# Patient Record
Sex: MALE | Race: WHITE
[De-identification: names, ages, dates, MRNs, and addresses within clinical notes are randomized per-mention and may not be internally consistent; named-entity substitution may affect disease eponyms.]

---

## 2019-02-16 ENCOUNTER — HOSPITAL ENCOUNTER (EMERGENCY)
Dept: HOSPITAL 92 - SCSER | Age: 69
Discharge: HOME | End: 2019-02-16
Payer: COMMERCIAL

## 2019-02-16 DIAGNOSIS — N41.9: Primary | ICD-10-CM

## 2019-02-16 DIAGNOSIS — Z79.899: ICD-10-CM

## 2019-02-16 DIAGNOSIS — Z79.82: ICD-10-CM

## 2019-02-16 DIAGNOSIS — N40.0: ICD-10-CM

## 2019-02-16 LAB
ALBUMIN SERPL BCG-MCNC: 4.2 G/DL (ref 3.4–4.8)
ALP SERPL-CCNC: 76 U/L (ref 40–150)
ALT SERPL W P-5'-P-CCNC: 22 U/L (ref 8–55)
ANION GAP SERPL CALC-SCNC: 16 MMOL/L (ref 10–20)
AST SERPL-CCNC: 23 U/L (ref 5–34)
BACTERIA UR QL AUTO: (no result) HPF
BASOPHILS # BLD AUTO: 0.1 THOU/UL (ref 0–0.2)
BASOPHILS NFR BLD AUTO: 0.8 % (ref 0–1)
BILIRUB SERPL-MCNC: 0.7 MG/DL (ref 0.2–1.2)
BUN SERPL-MCNC: 17 MG/DL (ref 8.4–25.7)
CALCIUM SERPL-MCNC: 9.6 MG/DL (ref 7.8–10.44)
CHLORIDE SERPL-SCNC: 106 MMOL/L (ref 98–107)
CO2 SERPL-SCNC: 24 MMOL/L (ref 23–31)
CREAT CL PREDICTED SERPL C-G-VRATE: 0 ML/MIN (ref 70–130)
EOSINOPHIL # BLD AUTO: 0.1 THOU/UL (ref 0–0.7)
EOSINOPHIL NFR BLD AUTO: 1.4 % (ref 0–10)
GLOBULIN SER CALC-MCNC: 3.4 G/DL (ref 2.4–3.5)
GLUCOSE SERPL-MCNC: 103 MG/DL (ref 80–115)
HGB BLD-MCNC: 15.6 G/DL (ref 14–18)
LYMPHOCYTES # BLD: 1.4 THOU/UL (ref 1.2–3.4)
LYMPHOCYTES NFR BLD AUTO: 14.6 % (ref 21–51)
MCH RBC QN AUTO: 31.6 PG (ref 27–31)
MCV RBC AUTO: 88.7 FL (ref 78–98)
MONOCYTES # BLD AUTO: 1 THOU/UL (ref 0.11–0.59)
MONOCYTES NFR BLD AUTO: 10.5 % (ref 0–10)
NEUTROPHILS # BLD AUTO: 7 THOU/UL (ref 1.4–6.5)
NEUTROPHILS NFR BLD AUTO: 72.7 % (ref 42–75)
PLATELET # BLD AUTO: 171 THOU/UL (ref 130–400)
POTASSIUM SERPL-SCNC: 3.8 MMOL/L (ref 3.5–5.1)
PROT UR STRIP.AUTO-MCNC: 30 MG/DL
RBC # BLD AUTO: 4.93 MILL/UL (ref 4.7–6.1)
RBC UR QL AUTO: (no result) HPF (ref 0–3)
SODIUM SERPL-SCNC: 142 MMOL/L (ref 136–145)
SP GR UR STRIP: 1.02 (ref 1–1.03)
WBC # BLD AUTO: 9.6 THOU/UL (ref 4.8–10.8)
WBC UR QL AUTO: (no result) HPF (ref 0–3)

## 2019-02-16 PROCEDURE — 87491 CHLMYD TRACH DNA AMP PROBE: CPT

## 2019-02-16 PROCEDURE — 85025 COMPLETE CBC W/AUTO DIFF WBC: CPT

## 2019-02-16 PROCEDURE — 81003 URINALYSIS AUTO W/O SCOPE: CPT

## 2019-02-16 PROCEDURE — 76870 US EXAM SCROTUM: CPT

## 2019-02-16 PROCEDURE — 80053 COMPREHEN METABOLIC PANEL: CPT

## 2019-02-16 PROCEDURE — 74177 CT ABD & PELVIS W/CONTRAST: CPT

## 2019-02-16 PROCEDURE — 81015 MICROSCOPIC EXAM OF URINE: CPT

## 2019-02-16 PROCEDURE — 87086 URINE CULTURE/COLONY COUNT: CPT

## 2019-02-16 PROCEDURE — 87591 N.GONORRHOEAE DNA AMP PROB: CPT

## 2019-02-16 NOTE — CT
CT ABDOMEN AND PELVIS WITH IV CONTRAST:

2/16/19

 

HISTORY: 

Abdominal pain, difficulty urinating and painful urination. 

 

FINDINGS:  

The lung bases are unremarkable. The liver, spleen, pancreas, adrenal glands and kidneys are normal. 
No calcified gallstones are seen. No free air, free fluid, or lymphadenopathy seen in the abdomen or 
pelvis. A normal appearing appendix is seen. There is colonic diverticulosis without evidence of dive
rticulitis. No aneurysmal dilatation of the abdominal aorta is seen. the prostate is enlarged. Degene
rative changes are present in the spine. 

 

IMPRESSION:  

1.      Colonic diverticulosis.

2.      Prostatic enlargement.

 

POS: [facility]

## 2019-02-16 NOTE — ULT
TESTICULAR ULTRASOUND WITH GRAY SCALE, COLOR FLOW AND SPECTRAL DOPPLER IMAGIN19

 

HISTORY: 

Testicular pain radiating from abdomen. 

 

FINDINGS:  

The right testis measures 4.6 x 2.3 x 2.9 cm and the left testis measures 4.3 x 2.6 x 3 cm. No testic
ular mass or microlithiasis are seen. Symmetric flow is demonstrated to the testes and epididymi bila
terally. There are cysts in the epididymal head on either side. Bilateral hydroceles are seen, left g
reater than right.

 

IMPRESSION:  

1.      No evidence of testicular mass or torsion. 

2.      Epididymal head cysts/spermatoceles.

3.      Bilateral hydroceles, left greater than right. 

 

POS: [facility]